# Patient Record
Sex: OTHER/UNKNOWN | URBAN - METROPOLITAN AREA
[De-identification: names, ages, dates, MRNs, and addresses within clinical notes are randomized per-mention and may not be internally consistent; named-entity substitution may affect disease eponyms.]

---

## 2023-08-14 ENCOUNTER — APPOINTMENT (OUTPATIENT)
Dept: URBAN - METROPOLITAN AREA CLINIC 253 | Age: 38
Setting detail: DERMATOLOGY
End: 2023-08-14

## 2023-08-14 VITALS — WEIGHT: 155 LBS | HEIGHT: 65 IN | RESPIRATION RATE: 14 BRPM

## 2023-08-14 DIAGNOSIS — D47.01 CUTANEOUS MASTOCYTOSIS: ICD-10-CM

## 2023-08-14 DIAGNOSIS — S31000A OPEN WOUND(S) (MULTIPLE) OF UNSPECIFIED SITE(S), WITHOUT MENTION OF COMPLICATION: ICD-10-CM

## 2023-08-14 PROBLEM — S41.111A LACERATION WITHOUT FOREIGN BODY OF RIGHT UPPER ARM, INITIAL ENCOUNTER: Status: ACTIVE | Noted: 2023-08-14

## 2023-08-14 PROCEDURE — OTHER MIPS QUALITY: OTHER

## 2023-08-14 PROCEDURE — OTHER ADDITIONAL NOTES: OTHER

## 2023-08-14 PROCEDURE — 99202 OFFICE O/P NEW SF 15 MIN: CPT

## 2023-08-14 PROCEDURE — OTHER COUNSELING: OTHER

## 2023-08-14 ASSESSMENT — LOCATION ZONE DERM
LOCATION ZONE: ARM
LOCATION ZONE: TRUNK

## 2023-08-14 ASSESSMENT — LOCATION SIMPLE DESCRIPTION DERM
LOCATION SIMPLE: RIGHT LOWER BACK
LOCATION SIMPLE: RIGHT POSTERIOR UPPER ARM

## 2023-08-14 ASSESSMENT — LOCATION DETAILED DESCRIPTION DERM
LOCATION DETAILED: RIGHT DISTAL LATERAL POSTERIOR UPPER ARM
LOCATION DETAILED: RIGHT SUPERIOR LATERAL LOWER BACK

## 2023-08-14 NOTE — HPI: SKIN LESION
What Type Of Note Output Would You Prefer (Optional)?: Standard Output
Has Your Skin Lesion Been Treated?: not been treated
Is This A New Presentation, Or A Follow-Up?: Skin Lesion
Additional History: The patient reports they have a bump on their right lower back that they report reappears every 6 months for about 1 week. This has been going on for about 6 years. The patient reports this lesion is itchy and bleeds with scratching.

## 2023-08-14 NOTE — PROCEDURE: ADDITIONAL NOTES
Additional Notes: Encouraged the patient to continue use of vitamin e and Moderna lotion on this site. Advised patient to use daily SPF on this area. Informed patient that it can take up to one year for the PIH in this site to resolve.
Render Risk Assessment In Note?: no
Detail Level: Simple
Additional Notes: The patient showed photos of this lesion when it was flared. 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 \\n\\nReassured patient that this lesion is benign. \\nEncouraged patient to take a daily antihistamine when they will be in aggravating environments (around mosquitoes, working out, etc) to help prevent itchiness. Informed patient that stress can also trigger this lesion to arise. Recommended patient use hydrocortisone cream and ice packs to help calm the lesion when it is present. \\nIf this lesion does not resolve, worsens, or spreads to other areas of the body, the patient is instructed to return to the clinic for a punch biopsy.